# Patient Record
Sex: FEMALE | Race: OTHER | NOT HISPANIC OR LATINO | Employment: OTHER
[De-identification: names, ages, dates, MRNs, and addresses within clinical notes are randomized per-mention and may not be internally consistent; named-entity substitution may affect disease eponyms.]

---

## 2018-08-27 ENCOUNTER — ESTABLISHED COMPREHENSIVE EXAM (OUTPATIENT)
Age: 53
End: 2018-08-27

## 2018-08-27 DIAGNOSIS — H52.203: ICD-10-CM

## 2018-08-27 DIAGNOSIS — H52.13: ICD-10-CM

## 2018-08-27 DIAGNOSIS — H52.4: ICD-10-CM

## 2018-08-27 PROCEDURE — 92310-1 LEVEL 1 CONTACT LENS MANAGEMENT

## 2018-08-27 PROCEDURE — 92015 DETERMINE REFRACTIVE STATE: CPT

## 2018-08-27 PROCEDURE — 92014 COMPRE OPH EXAM EST PT 1/>: CPT

## 2018-08-27 ASSESSMENT — VISUAL ACUITY
OU_CC: J2
OU_CC: 20/20
OS_OTHER: OR+.50 J1
OS_CC: 20/50-1
OD_CC: J6
OS_CC: J2
OD_CC: 20/20-1

## 2018-08-27 ASSESSMENT — TONOMETRY
OS_IOP_MMHG: 17
OD_IOP_MMHG: 17

## 2019-09-23 ENCOUNTER — ESTABLISHED COMPREHENSIVE EXAM (OUTPATIENT)
Dept: URBAN - METROPOLITAN AREA CLINIC 38 | Facility: CLINIC | Age: 54
End: 2019-09-23

## 2019-09-23 DIAGNOSIS — H52.13: ICD-10-CM

## 2019-09-23 DIAGNOSIS — H52.203: ICD-10-CM

## 2019-09-23 DIAGNOSIS — H52.4: ICD-10-CM

## 2019-09-23 PROCEDURE — 92014 COMPRE OPH EXAM EST PT 1/>: CPT

## 2019-09-23 PROCEDURE — 92310-1 LEVEL 1 CONTACT LENS MANAGEMENT

## 2019-09-23 PROCEDURE — 92015 DETERMINE REFRACTIVE STATE: CPT

## 2019-09-23 ASSESSMENT — VISUAL ACUITY
OS_CC: 20/40-
OU_CC: 20/20
OD_CC: 20/20

## 2019-09-23 ASSESSMENT — TONOMETRY
OS_IOP_MMHG: 17
OD_IOP_MMHG: 17

## 2020-11-02 ENCOUNTER — CONTACT LENS EXAM ESTABLISHED (OUTPATIENT)
Dept: URBAN - METROPOLITAN AREA CLINIC 38 | Facility: CLINIC | Age: 55
End: 2020-11-02

## 2020-11-02 DIAGNOSIS — H52.4: ICD-10-CM

## 2020-11-02 DIAGNOSIS — H52.13: ICD-10-CM

## 2020-11-02 DIAGNOSIS — H52.203: ICD-10-CM

## 2020-11-02 PROCEDURE — 92015 DETERMINE REFRACTIVE STATE: CPT

## 2020-11-02 PROCEDURE — 92310-1 LEVEL 1 CONTACT LENS MANAGEMENT

## 2020-11-02 PROCEDURE — 92014 COMPRE OPH EXAM EST PT 1/>: CPT

## 2020-11-02 ASSESSMENT — VISUAL ACUITY
OD_CC: 20/20-
OS_CC: J2-
OD_CC: J12-
OU_CC: 20/20
OS_CC: 20/20-
OD_OTHER: OR +.50 20/20
OU_CC: J2-
OS_OTHER: OR -.50 20/20

## 2020-11-02 ASSESSMENT — TONOMETRY
OS_IOP_MMHG: 17
OD_IOP_MMHG: 17

## 2021-05-28 ENCOUNTER — EST. PATIENT EMERGENCY (OUTPATIENT)
Dept: URBAN - METROPOLITAN AREA CLINIC 38 | Facility: CLINIC | Age: 56
End: 2021-05-28

## 2021-05-28 DIAGNOSIS — R51.9: ICD-10-CM

## 2021-05-28 PROCEDURE — 92012 INTRM OPH EXAM EST PATIENT: CPT

## 2021-05-28 ASSESSMENT — TONOMETRY
OD_IOP_MMHG: 16
OD_IOP_MMHG: 18
OS_IOP_MMHG: 20
OS_IOP_MMHG: 16

## 2021-05-28 ASSESSMENT — VISUAL ACUITY
OU_CC: 20/20
OD_CC: 20/20
OS_CC: 20/30-2

## 2021-06-15 ENCOUNTER — FOLLOW UP (OUTPATIENT)
Dept: URBAN - METROPOLITAN AREA CLINIC 38 | Facility: CLINIC | Age: 56
End: 2021-06-15

## 2021-06-15 DIAGNOSIS — R51.9: ICD-10-CM

## 2021-06-15 DIAGNOSIS — H02.886: ICD-10-CM

## 2021-06-15 DIAGNOSIS — H04.123: ICD-10-CM

## 2021-06-15 DIAGNOSIS — H02.883: ICD-10-CM

## 2021-06-15 PROCEDURE — 99212 OFFICE O/P EST SF 10 MIN: CPT

## 2021-06-15 PROCEDURE — 92083 EXTENDED VISUAL FIELD XM: CPT

## 2021-06-15 ASSESSMENT — VISUAL ACUITY
OU_CC: 20/20
OD_CC: 20/20
OS_CC: 20/20-1

## 2021-06-15 ASSESSMENT — TONOMETRY
OS_IOP_MMHG: 15
OD_IOP_MMHG: 16

## 2021-12-03 ENCOUNTER — ESTABLISHED COMPREHENSIVE EXAM (OUTPATIENT)
Dept: URBAN - METROPOLITAN AREA CLINIC 38 | Facility: CLINIC | Age: 56
End: 2021-12-03

## 2021-12-03 DIAGNOSIS — H52.13: ICD-10-CM

## 2021-12-03 DIAGNOSIS — H52.203: ICD-10-CM

## 2021-12-03 DIAGNOSIS — H52.4: ICD-10-CM

## 2021-12-03 PROCEDURE — 92015 DETERMINE REFRACTIVE STATE: CPT

## 2021-12-03 PROCEDURE — 92014 COMPRE OPH EXAM EST PT 1/>: CPT

## 2021-12-03 PROCEDURE — 92310-1 LEVEL 1 CONTACT LENS MANAGEMENT

## 2021-12-03 ASSESSMENT — VISUAL ACUITY
OU_CC: J2
OU_CC: 20/20
OS_CC: J2
OD_CC: 20/20-1
OS_PH: 20/25
OD_CC: J12
OS_CC: 20/40

## 2021-12-03 ASSESSMENT — TONOMETRY
OS_IOP_MMHG: 18
OD_IOP_MMHG: 18

## 2022-02-10 NOTE — PATIENT DISCUSSION
Patient describes intermittent vision loss that can't be blinked away.  Refer to Dr. Rodríguez Elizabeth for eval.

## 2022-02-10 NOTE — PATIENT DISCUSSION
Patient describes eyelid swelling OU particularly at night.  We discussed the fact that they're not swollen in office today.  Patient has thyroid disease that she indicates is now managed but believes there might be some connection.  Refer to Dr. Ck Jewell earliest convenience for eval.

## 2022-12-09 ENCOUNTER — COMPREHENSIVE EXAM (OUTPATIENT)
Dept: URBAN - METROPOLITAN AREA CLINIC 38 | Facility: CLINIC | Age: 57
End: 2022-12-09

## 2022-12-09 PROCEDURE — 92014 COMPRE OPH EXAM EST PT 1/>: CPT

## 2022-12-09 PROCEDURE — 92310-1 LEVEL 1 CONTACT LENS MANAGEMENT

## 2022-12-09 PROCEDURE — 92015 DETERMINE REFRACTIVE STATE: CPT

## 2022-12-09 ASSESSMENT — VISUAL ACUITY
OS_CC: 20/25
OS_CC: J1
OU_CC: J1
OD_CC: 20/25
OU_CC: 20/20
OD_CC: J1

## 2022-12-09 ASSESSMENT — TONOMETRY
OS_IOP_MMHG: 18
OD_IOP_MMHG: 17

## 2022-12-21 ENCOUNTER — TECH ONLY (OUTPATIENT)
Dept: URBAN - METROPOLITAN AREA CLINIC 38 | Facility: CLINIC | Age: 57
End: 2022-12-21

## 2022-12-21 PROCEDURE — 99211T TECH SERVICE

## 2022-12-21 PROCEDURE — 92134 CPTRZ OPH DX IMG PST SGM RTA: CPT

## 2022-12-21 PROCEDURE — 92250 FUNDUS PHOTOGRAPHY W/I&R: CPT

## 2023-12-11 ENCOUNTER — COMPREHENSIVE EXAM (OUTPATIENT)
Dept: URBAN - METROPOLITAN AREA CLINIC 38 | Facility: CLINIC | Age: 58
End: 2023-12-11

## 2023-12-11 DIAGNOSIS — H52.203: ICD-10-CM

## 2023-12-11 DIAGNOSIS — H52.4: ICD-10-CM

## 2023-12-11 DIAGNOSIS — H52.13: ICD-10-CM

## 2023-12-11 PROCEDURE — 92014 COMPRE OPH EXAM EST PT 1/>: CPT

## 2023-12-11 PROCEDURE — 92015 DETERMINE REFRACTIVE STATE: CPT

## 2023-12-11 ASSESSMENT — VISUAL ACUITY
OU_CC: J1-
OD_CC: J2
OD_CC: 20/20-1
OS_CC: J3-
OS_CC: 20/25

## 2023-12-11 ASSESSMENT — TONOMETRY
OD_IOP_MMHG: 19
OS_IOP_MMHG: 19

## 2023-12-22 ENCOUNTER — CONSULTATION/EVALUATION (OUTPATIENT)
Dept: URBAN - METROPOLITAN AREA CLINIC 39 | Facility: CLINIC | Age: 58
End: 2023-12-22

## 2023-12-22 DIAGNOSIS — H25.813: ICD-10-CM

## 2023-12-22 PROCEDURE — 92286 ANT SGM IMG I&R SPECLR MIC: CPT

## 2023-12-22 PROCEDURE — 92025 CPTRIZED CORNEAL TOPOGRAPHY: CPT

## 2023-12-22 PROCEDURE — V2799PMN IMPRIMIS PRED-MOXI-NEPAF 5ML

## 2023-12-22 PROCEDURE — 92136 OPHTHALMIC BIOMETRY: CPT

## 2023-12-22 PROCEDURE — 99204 OFFICE O/P NEW MOD 45 MIN: CPT

## 2023-12-22 PROCEDURE — 92134 CPTRZ OPH DX IMG PST SGM RTA: CPT

## 2023-12-22 RX ORDER — CYCLOSPORINE 0 MG/ML: 1 SOLUTION/ DROPS OPHTHALMIC; TOPICAL TWICE A DAY

## 2023-12-22 ASSESSMENT — TONOMETRY
OD_IOP_MMHG: 18
OS_IOP_MMHG: 18

## 2023-12-22 ASSESSMENT — VISUAL ACUITY
OS_SC: J1
OS_CC: 20/25
OS_SC: 20/400
OD_CC: 20/20
OS_CC: J1
OD_SC: 20/400
OD_CC: J1
OD_SC: J1

## 2024-01-23 ENCOUNTER — TECH ONLY (OUTPATIENT)
Dept: URBAN - METROPOLITAN AREA CLINIC 39 | Facility: CLINIC | Age: 59
End: 2024-01-23

## 2024-01-23 DIAGNOSIS — H25.813: ICD-10-CM

## 2024-01-23 PROCEDURE — 99211T TECH SERVICE

## 2024-01-23 PROCEDURE — 92136 OPHTHALMIC BIOMETRY: CPT

## 2024-01-23 PROCEDURE — 92025NC COMP. CORNEAL TOPO, UNI OR BILAT,

## 2024-01-31 ENCOUNTER — PRE-OP/H&P (OUTPATIENT)
Dept: URBAN - METROPOLITAN AREA SURGERY 14 | Facility: SURGERY | Age: 59
End: 2024-01-31

## 2024-01-31 ENCOUNTER — SURGERY/PROCEDURE (OUTPATIENT)
Dept: URBAN - METROPOLITAN AREA SURGERY 14 | Facility: SURGERY | Age: 59
End: 2024-01-31

## 2024-01-31 DIAGNOSIS — H25.813: ICD-10-CM

## 2024-01-31 PROCEDURE — 99211HP PRE-OP

## 2024-01-31 PROCEDURE — 66984LAL REMOVE CATARACT; INSERT LAL LENS

## 2024-01-31 PROCEDURE — 65772LRI LRI DURING CAT SX

## 2024-01-31 PROCEDURE — 66999LNSR LENSAR LASER FOR CAT SX

## 2024-01-31 PROCEDURE — 99199PPLAL SPECIAL SERVICE OR REPORT INSERT LAL LENS

## 2024-02-01 ENCOUNTER — PRE-OP/H&P (OUTPATIENT)
Dept: URBAN - METROPOLITAN AREA SURGERY 14 | Facility: SURGERY | Age: 59
End: 2024-02-01

## 2024-02-01 ENCOUNTER — SURGERY/PROCEDURE (OUTPATIENT)
Dept: URBAN - METROPOLITAN AREA SURGERY 14 | Facility: SURGERY | Age: 59
End: 2024-02-01

## 2024-02-01 DIAGNOSIS — H25.813: ICD-10-CM

## 2024-02-01 PROCEDURE — 66999LNSR LENSAR LASER FOR CAT SX

## 2024-02-01 PROCEDURE — 66984LAL REMOVE CATARACT; INSERT LAL LENS

## 2024-02-01 PROCEDURE — 65772LRI LRI DURING CAT SX

## 2024-02-01 PROCEDURE — 99211HP PRE-OP

## 2024-02-01 PROCEDURE — 99199PPLAL SPECIAL SERVICE OR REPORT INSERT LAL LENS

## 2024-02-02 ENCOUNTER — POST-OP (OUTPATIENT)
Dept: URBAN - METROPOLITAN AREA CLINIC 39 | Facility: CLINIC | Age: 59
End: 2024-02-02

## 2024-02-02 DIAGNOSIS — Z96.1: ICD-10-CM

## 2024-02-02 PROCEDURE — P6698455 NON-COMANAGED ADVANCED PO

## 2024-02-02 ASSESSMENT — VISUAL ACUITY
OD_SC: 20/30-2
OU_SC: J2
OU_SC: 20/30-1
OS_SC: J2
OS_SC: 20/50-2
OD_SC: J7

## 2024-02-02 ASSESSMENT — TONOMETRY
OS_IOP_MMHG: 19
OD_IOP_MMHG: 19

## 2024-02-16 ENCOUNTER — POST-OP (OUTPATIENT)
Dept: URBAN - METROPOLITAN AREA CLINIC 38 | Facility: CLINIC | Age: 59
End: 2024-02-16

## 2024-02-16 DIAGNOSIS — Z96.1: ICD-10-CM

## 2024-02-16 DIAGNOSIS — H26.493: ICD-10-CM

## 2024-02-16 PROCEDURE — 99024 POSTOP FOLLOW-UP VISIT: CPT

## 2024-02-16 ASSESSMENT — KERATOMETRY
OS_K1POWER_DIOPTERS: 46.25
OS_K2POWER_DIOPTERS: 46.75
OD_K1POWER_DIOPTERS: 46.75
OD_K2POWER_DIOPTERS: 47.5
OD_AXISANGLE_DEGREES: 5
OS_AXISANGLE_DEGREES: 160
OS_AXISANGLE2_DEGREES: 70
OD_AXISANGLE2_DEGREES: 95

## 2024-02-16 ASSESSMENT — VISUAL ACUITY
OS_SC: J3-
OU_SC: 20/25-
OD_SC: 20/30-1
OS_SC: 20/30-1
OD_SC: J10-
OU_SC: J3-

## 2024-02-16 ASSESSMENT — TONOMETRY
OS_IOP_MMHG: 15
OD_IOP_MMHG: 13

## 2024-02-22 ENCOUNTER — CONSULTATION/EVALUATION (OUTPATIENT)
Dept: URBAN - METROPOLITAN AREA CLINIC 39 | Facility: CLINIC | Age: 59
End: 2024-02-22

## 2024-02-22 ENCOUNTER — SURGERY/PROCEDURE (OUTPATIENT)
Facility: LOCATION | Age: 59
End: 2024-02-22

## 2024-02-22 DIAGNOSIS — H26.493: ICD-10-CM

## 2024-02-22 PROCEDURE — 6682150 YAG CAPSULOTOMY

## 2024-02-22 PROCEDURE — 99214 OFFICE O/P EST MOD 30 MIN: CPT

## 2024-02-22 ASSESSMENT — TONOMETRY
OS_IOP_MMHG: 14
OD_IOP_MMHG: 14

## 2024-02-22 ASSESSMENT — VISUAL ACUITY
OD_SC: J8
OS_SC: J3
OS_SC: 20/30-1
OD_BAT: 20/60
OS_BAT: 20/40
OD_SC: 20/30-2

## 2024-02-23 ENCOUNTER — PREPPED CHART (OUTPATIENT)
Dept: URBAN - METROPOLITAN AREA CLINIC 39 | Facility: CLINIC | Age: 59
End: 2024-02-23

## 2024-02-29 ENCOUNTER — POST-OP (OUTPATIENT)
Dept: URBAN - METROPOLITAN AREA CLINIC 38 | Facility: CLINIC | Age: 59
End: 2024-02-29

## 2024-02-29 DIAGNOSIS — Z98.890: ICD-10-CM

## 2024-02-29 PROCEDURE — 99024 POSTOP FOLLOW-UP VISIT: CPT

## 2024-02-29 ASSESSMENT — VISUAL ACUITY
OS_SC: 20/40-2
OU_SC: J2
OU_SC: 20/25
OS_SC: J3
OD_SC: 20/25
OD_SC: J7

## 2024-02-29 ASSESSMENT — KERATOMETRY
OS_K2POWER_DIOPTERS: 46.75
OS_K1POWER_DIOPTERS: 46.25
OD_AXISANGLE2_DEGREES: 105
OD_K1POWER_DIOPTERS: 46.75
OS_AXISANGLE2_DEGREES: 70
OD_AXISANGLE_DEGREES: 15
OD_K2POWER_DIOPTERS: 47.75
OS_AXISANGLE_DEGREES: 160

## 2024-02-29 ASSESSMENT — TONOMETRY
OS_IOP_MMHG: 18
OD_IOP_MMHG: 17

## 2024-03-04 ENCOUNTER — CLINIC PROCEDURE ONLY (OUTPATIENT)
Dept: URBAN - METROPOLITAN AREA CLINIC 39 | Facility: CLINIC | Age: 59
End: 2024-03-04

## 2024-03-04 DIAGNOSIS — Z96.1: ICD-10-CM

## 2024-03-04 PROCEDURE — 66984LALA LAL ADJUSTMENT

## 2024-03-04 ASSESSMENT — VISUAL ACUITY
OS_SC: J2
OS_SC: 20/60-1
OD_SC: 20/30-2
OU_SC: J2
OU_SC: 20/25-2
OD_SC: J10

## 2024-03-11 ENCOUNTER — CLINIC PROCEDURE ONLY (OUTPATIENT)
Dept: URBAN - METROPOLITAN AREA CLINIC 39 | Facility: CLINIC | Age: 59
End: 2024-03-11

## 2024-03-11 DIAGNOSIS — Z96.1: ICD-10-CM

## 2024-03-11 PROCEDURE — 66984LALA LAL ADJUSTMENT

## 2024-03-11 ASSESSMENT — VISUAL ACUITY
OD_SC: J12
OU_SC: J1
OS_SC: 20/60
OU_SC: 20/20-2
OS_SC: J1
OD_SC: 20/20-2

## 2024-03-18 ENCOUNTER — CLINIC PROCEDURE ONLY (OUTPATIENT)
Dept: URBAN - METROPOLITAN AREA CLINIC 39 | Facility: CLINIC | Age: 59
End: 2024-03-18

## 2024-03-18 DIAGNOSIS — Z96.1: ICD-10-CM

## 2024-03-18 PROCEDURE — 66984LALA LAL ADJUSTMENT

## 2024-03-18 ASSESSMENT — VISUAL ACUITY
OU_SC: J1
OD_SC: 20/25+2
OU_SC: 20/20-1
OS_SC: 20/60
OS_SC: J1
OD_SC: J12

## 2024-04-05 ENCOUNTER — EMERGENCY VISIT (OUTPATIENT)
Dept: URBAN - METROPOLITAN AREA CLINIC 38 | Facility: CLINIC | Age: 59
End: 2024-04-05

## 2024-04-05 DIAGNOSIS — H04.123: ICD-10-CM

## 2024-04-05 DIAGNOSIS — H01.02B: ICD-10-CM

## 2024-04-05 DIAGNOSIS — H01.02A: ICD-10-CM

## 2024-04-05 PROCEDURE — 99213 OFFICE O/P EST LOW 20 MIN: CPT | Mod: 24

## 2024-04-05 ASSESSMENT — VISUAL ACUITY
OD_SC: 20/20-1
OS_SC: J1
OU_SC: 20/20-2

## 2024-04-05 ASSESSMENT — KERATOMETRY
OD_K2POWER_DIOPTERS: 47.75
OD_AXISANGLE2_DEGREES: 105
OD_AXISANGLE_DEGREES: 15
OS_K2POWER_DIOPTERS: 46.75
OS_K1POWER_DIOPTERS: 46.25
OS_AXISANGLE2_DEGREES: 70
OD_K1POWER_DIOPTERS: 46.75
OS_AXISANGLE_DEGREES: 160

## 2024-04-05 ASSESSMENT — TONOMETRY
OD_IOP_MMHG: 16
OS_IOP_MMHG: 15

## 2024-04-08 ENCOUNTER — CLINIC PROCEDURE ONLY (OUTPATIENT)
Dept: URBAN - METROPOLITAN AREA CLINIC 39 | Facility: CLINIC | Age: 59
End: 2024-04-08

## 2024-04-08 DIAGNOSIS — Z96.1: ICD-10-CM

## 2024-04-08 PROCEDURE — 66984LALA LAL ADJUSTMENT

## 2024-04-08 ASSESSMENT — VISUAL ACUITY
OS_SC: 20/70
OU_SC: 20/20-2
OS_SC: J1
OD_SC: J10
OU_SC: J1
OD_SC: 20/20-2

## 2024-04-22 ENCOUNTER — CLINIC PROCEDURE ONLY (OUTPATIENT)
Dept: URBAN - METROPOLITAN AREA CLINIC 39 | Facility: CLINIC | Age: 59
End: 2024-04-22

## 2024-04-22 DIAGNOSIS — Z96.1: ICD-10-CM

## 2024-04-22 PROCEDURE — 66984LALA LAL ADJUSTMENT

## 2024-04-22 ASSESSMENT — VISUAL ACUITY
OD_SC: J10
OD_SC: 20/20-1
OS_SC: J1
OU_SC: J1
OU_SC: 20/20-1
OS_SC: 20/70

## 2024-05-13 ENCOUNTER — POST-OP (OUTPATIENT)
Dept: URBAN - METROPOLITAN AREA CLINIC 38 | Facility: CLINIC | Age: 59
End: 2024-05-13

## 2024-05-13 DIAGNOSIS — Z96.1: ICD-10-CM

## 2024-05-13 PROCEDURE — 99024 POSTOP FOLLOW-UP VISIT: CPT

## 2024-05-13 RX ORDER — LIFITEGRAST 50 MG/ML
1 SOLUTION/ DROPS OPHTHALMIC TWICE A DAY
Start: 2024-05-13

## 2024-05-13 ASSESSMENT — VISUAL ACUITY
OD_SC: J8
OU_SC: J1
OS_SC: J1
OS_SC: 20/50
OU_SC: 20/20-1
OD_SC: 20/20-1

## 2024-05-13 ASSESSMENT — KERATOMETRY
OS_K1POWER_DIOPTERS: 46.50
OD_AXISANGLE_DEGREES: 10
OD_K2POWER_DIOPTERS: 47.75
OS_AXISANGLE_DEGREES: 165
OS_AXISANGLE2_DEGREES: 75
OS_K2POWER_DIOPTERS: 47.50
OD_AXISANGLE2_DEGREES: 100
OD_K1POWER_DIOPTERS: 46.75

## 2024-05-13 ASSESSMENT — TONOMETRY
OD_IOP_MMHG: 17
OS_IOP_MMHG: 18

## 2024-06-17 ENCOUNTER — POST-OP (OUTPATIENT)
Dept: URBAN - METROPOLITAN AREA CLINIC 38 | Facility: CLINIC | Age: 59
End: 2024-06-17

## 2024-06-17 DIAGNOSIS — H43.392: ICD-10-CM

## 2024-06-17 PROCEDURE — 99213 OFFICE O/P EST LOW 20 MIN: CPT | Mod: 24,LT

## 2024-06-17 ASSESSMENT — KERATOMETRY
OD_K2POWER_DIOPTERS: 47.75
OS_K2POWER_DIOPTERS: 47.50
OS_AXISANGLE2_DEGREES: 75
OS_K1POWER_DIOPTERS: 46.50
OD_AXISANGLE_DEGREES: 10
OD_K1POWER_DIOPTERS: 46.75
OS_AXISANGLE_DEGREES: 165
OD_AXISANGLE2_DEGREES: 100

## 2024-06-17 ASSESSMENT — TONOMETRY
OS_IOP_MMHG: 18
OD_IOP_MMHG: 19

## 2024-06-17 ASSESSMENT — VISUAL ACUITY
OS_SC: 20/80-1
OD_SC: 20/25+2
OS_SC: J1
OD_SC: J12-
OU_SC: 20/20-
OU_SC: J1-

## 2024-07-15 ENCOUNTER — FOLLOW UP (OUTPATIENT)
Dept: URBAN - METROPOLITAN AREA CLINIC 38 | Facility: CLINIC | Age: 59
End: 2024-07-15

## 2024-07-15 DIAGNOSIS — D48.7: ICD-10-CM

## 2024-07-15 DIAGNOSIS — Z96.1: ICD-10-CM

## 2024-07-15 PROCEDURE — 99212 OFFICE O/P EST SF 10 MIN: CPT | Mod: 24,RT

## 2024-07-15 PROCEDURE — 99024 POSTOP FOLLOW-UP VISIT: CPT

## 2024-07-15 ASSESSMENT — KERATOMETRY
OD_K2POWER_DIOPTERS: 47.75
OD_K1POWER_DIOPTERS: 46.75
OS_K1POWER_DIOPTERS: 46.50
OD_AXISANGLE_DEGREES: 10
OS_K2POWER_DIOPTERS: 47.50
OS_AXISANGLE_DEGREES: 165
OD_AXISANGLE2_DEGREES: 100
OS_AXISANGLE2_DEGREES: 75

## 2024-07-15 ASSESSMENT — VISUAL ACUITY
OS_SC: J1
OD_SC: 20/25+2
OU_SC: 20/25
OD_SC: J12
OU_SC: J1
OS_SC: 20/80

## 2024-07-15 ASSESSMENT — TONOMETRY
OS_IOP_MMHG: 18
OD_IOP_MMHG: 19

## 2024-07-17 ENCOUNTER — CONSULTATION/EVALUATION (OUTPATIENT)
Dept: URBAN - METROPOLITAN AREA CLINIC 35 | Facility: CLINIC | Age: 59
End: 2024-07-17

## 2024-07-17 DIAGNOSIS — H04.123: ICD-10-CM

## 2024-07-17 DIAGNOSIS — D49.2: ICD-10-CM

## 2024-07-17 PROCEDURE — 92285 EXTERNAL OCULAR PHOTOGRAPHY: CPT

## 2024-07-17 PROCEDURE — 99213 OFFICE O/P EST LOW 20 MIN: CPT | Mod: 24

## 2024-07-17 ASSESSMENT — KERATOMETRY
OS_K2POWER_DIOPTERS: 47.50
OS_K1POWER_DIOPTERS: 46.50
OS_AXISANGLE_DEGREES: 165
OD_AXISANGLE_DEGREES: 10
OD_K1POWER_DIOPTERS: 46.75
OD_AXISANGLE2_DEGREES: 100
OD_K2POWER_DIOPTERS: 47.75
OS_AXISANGLE2_DEGREES: 75

## 2024-07-17 ASSESSMENT — VISUAL ACUITY
OD_SC: 20/25
OS_SC: 20/80

## 2024-07-24 ENCOUNTER — FOLLOW UP (OUTPATIENT)
Dept: URBAN - METROPOLITAN AREA CLINIC 35 | Facility: CLINIC | Age: 59
End: 2024-07-24

## 2024-07-24 DIAGNOSIS — D49.2: ICD-10-CM

## 2024-07-24 PROCEDURE — 92285 EXTERNAL OCULAR PHOTOGRAPHY: CPT

## 2024-07-24 PROCEDURE — 67810 INCAL BX EYELID SKN LID MRGN: CPT | Mod: E4

## 2024-07-24 ASSESSMENT — KERATOMETRY
OD_K1POWER_DIOPTERS: 46.75
OD_AXISANGLE_DEGREES: 10
OS_K1POWER_DIOPTERS: 46.50
OS_AXISANGLE_DEGREES: 165
OD_K2POWER_DIOPTERS: 47.75
OS_K2POWER_DIOPTERS: 47.50
OD_AXISANGLE2_DEGREES: 100
OS_AXISANGLE2_DEGREES: 75

## 2024-07-24 ASSESSMENT — VISUAL ACUITY
OD_SC: 20/25
OS_SC: 20/80

## 2024-10-14 ENCOUNTER — FOLLOW UP (OUTPATIENT)
Dept: URBAN - METROPOLITAN AREA CLINIC 38 | Facility: CLINIC | Age: 59
End: 2024-10-14

## 2024-10-14 DIAGNOSIS — H04.123: ICD-10-CM

## 2024-10-14 DIAGNOSIS — Z96.1: ICD-10-CM

## 2024-10-14 DIAGNOSIS — D48.7: ICD-10-CM

## 2024-10-14 PROCEDURE — 99213 OFFICE O/P EST LOW 20 MIN: CPT

## 2024-11-01 ENCOUNTER — COMPREHENSIVE EXAM (OUTPATIENT)
Dept: URBAN - METROPOLITAN AREA CLINIC 38 | Facility: CLINIC | Age: 59
End: 2024-11-01

## 2024-11-01 DIAGNOSIS — Z96.1: ICD-10-CM

## 2024-11-01 DIAGNOSIS — H04.123: ICD-10-CM

## 2024-11-01 DIAGNOSIS — D48.7: ICD-10-CM

## 2024-11-01 PROCEDURE — 92014 COMPRE OPH EXAM EST PT 1/>: CPT
